# Patient Record
Sex: MALE | Race: WHITE | URBAN - METROPOLITAN AREA
[De-identification: names, ages, dates, MRNs, and addresses within clinical notes are randomized per-mention and may not be internally consistent; named-entity substitution may affect disease eponyms.]

---
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
## 2021-01-06 ENCOUNTER — OFFICE VISIT (OUTPATIENT)
Dept: PRIMARY CARE CLINIC | Age: 21
End: 2021-01-06
Payer: MEDICARE

## 2021-01-06 VITALS
DIASTOLIC BLOOD PRESSURE: 82 MMHG | HEIGHT: 74 IN | TEMPERATURE: 99.1 F | WEIGHT: 238.5 LBS | BODY MASS INDEX: 30.61 KG/M2 | HEART RATE: 74 BPM | RESPIRATION RATE: 20 BRPM | SYSTOLIC BLOOD PRESSURE: 132 MMHG | OXYGEN SATURATION: 98 %

## 2021-01-06 DIAGNOSIS — Z23 NEED FOR VACCINATION: ICD-10-CM

## 2021-01-06 DIAGNOSIS — R63.4 UNINTENDED WEIGHT LOSS: ICD-10-CM

## 2021-01-06 DIAGNOSIS — K52.9 COLITIS: ICD-10-CM

## 2021-01-06 DIAGNOSIS — K58.0 IRRITABLE BOWEL SYNDROME WITH DIARRHEA: Primary | ICD-10-CM

## 2021-01-06 PROCEDURE — 4004F PT TOBACCO SCREEN RCVD TLK: CPT | Performed by: NURSE PRACTITIONER

## 2021-01-06 PROCEDURE — 99214 OFFICE O/P EST MOD 30 MIN: CPT | Performed by: NURSE PRACTITIONER

## 2021-01-06 PROCEDURE — 90471 IMMUNIZATION ADMIN: CPT | Performed by: NURSE PRACTITIONER

## 2021-01-06 PROCEDURE — G8427 DOCREV CUR MEDS BY ELIG CLIN: HCPCS | Performed by: NURSE PRACTITIONER

## 2021-01-06 PROCEDURE — G8417 CALC BMI ABV UP PARAM F/U: HCPCS | Performed by: NURSE PRACTITIONER

## 2021-01-06 PROCEDURE — 90688 IIV4 VACCINE SPLT 0.5 ML IM: CPT | Performed by: NURSE PRACTITIONER

## 2021-01-06 PROCEDURE — G8482 FLU IMMUNIZE ORDER/ADMIN: HCPCS | Performed by: NURSE PRACTITIONER

## 2021-01-06 RX ORDER — ONDANSETRON 4 MG/1
4 TABLET, ORALLY DISINTEGRATING ORAL EVERY 8 HOURS PRN
COMMUNITY
Start: 2020-09-15 | End: 2021-01-06

## 2021-01-06 RX ORDER — DICYCLOMINE HCL 20 MG
20 TABLET ORAL 3 TIMES DAILY PRN
Qty: 90 TABLET | Refills: 0 | Status: SHIPPED | OUTPATIENT
Start: 2021-01-06 | End: 2021-03-09 | Stop reason: SDUPTHER

## 2021-01-06 ASSESSMENT — ENCOUNTER SYMPTOMS
SHORTNESS OF BREATH: 0
HEMATOCHEZIA: 0
VOMITING: 0
COUGH: 0
BELCHING: 0
DIARRHEA: 1
WHEEZING: 0
BLOATING: 1
CONSTIPATION: 0
NAUSEA: 1
SORE THROAT: 0
RHINORRHEA: 0
FLATUS: 0
ABDOMINAL PAIN: 1

## 2021-01-06 ASSESSMENT — PATIENT HEALTH QUESTIONNAIRE - PHQ9
SUM OF ALL RESPONSES TO PHQ9 QUESTIONS 1 & 2: 2
SUM OF ALL RESPONSES TO PHQ QUESTIONS 1-9: 2
1. LITTLE INTEREST OR PLEASURE IN DOING THINGS: 1

## 2021-01-06 ASSESSMENT — CROHNS DISEASE ACTIVITY INDEX (CDAI): CDAI SCORE: 0

## 2021-01-06 NOTE — PROGRESS NOTES
Name: Sami Rojas  : 2000         Chief Complaint:     Chief Complaint   Patient presents with    Weight Loss     X 2 months, loose stools, nausea, poor appetite       History of Present Illness:      Sami Rojas is a 24 y.o.  male who presents with Weight Loss (X 2 months, loose stools, nausea, poor appetite)      Marilou Pride is here today for a routine office visit. Diarrhea   This is a recurrent problem. The current episode started more than 1 month ago. The problem occurs less than 2 times per day. The problem has been waxing and waning. The patient states that diarrhea does not awaken him from sleep. Associated symptoms include abdominal pain, bloating and weight loss. Pertinent negatives include no arthralgias, chills, coughing, fever, headaches, increased  flatus, myalgias, sweats, URI or vomiting. The symptoms are aggravated by stress. There are no known risk factors. He has tried nothing for the symptoms. The treatment provided no relief. His past medical history is significant for inflammatory bowel disease (Suspected) and irritable bowel syndrome. There is no history of bowel resection, malabsorption, a recent abdominal surgery or short gut syndrome.    Abdominal Pain This is a recurrent problem. The current episode started more than 1 month ago. The onset quality is undetermined. The problem occurs intermittently. The problem has been unchanged. The pain is located in the generalized abdominal region. The pain is at a severity of 4/10. The pain is mild. The quality of the pain is cramping and dull. The abdominal pain does not radiate. Associated symptoms include anorexia, diarrhea, nausea and weight loss. Pertinent negatives include no arthralgias, belching, constipation, dysuria, fever, flatus, frequency, headaches, hematochezia, hematuria, melena, myalgias or vomiting. The pain is aggravated by eating. The pain is relieved by nothing. He has tried nothing for the symptoms. The treatment provided no relief. Prior diagnostic workup includes CT scan. His past medical history is significant for irritable bowel syndrome. There is no history of abdominal surgery, colon cancer, Crohn's disease, gallstones, GERD, pancreatitis, PUD or ulcerative colitis. Past Medical History:     Past Medical History:   Diagnosis Date    Anxiety     Asthma     Depression       Reviewed all health maintenance requirements and ordered appropriate tests  There are no preventive care reminders to display for this patient. Past Surgical History:     History reviewed. No pertinent surgical history. Medications:       Prior to Admission medications    Medication Sig Start Date End Date Taking? Authorizing Provider   dicyclomine (BENTYL) 20 MG tablet Take 1 tablet by mouth 3 times daily as needed (cramping) 1/6/21  Yes BLUE Couch CNP   Melatonin 5 MG CAPS Take 1 tablet by mouth nightly as needed (insomnia) 10/1/19  Yes [de-identified] M BLUE Powell CNP        Allergies:       Patient has no known allergies. Social History:     Tobacco:    reports that he has been smoking cigarettes. He has been smoking about 0.50 packs per day.  He has never used smokeless tobacco. Alcohol:      reports no history of alcohol use. Drug Use:  reports no history of drug use. Family History:     Family History   Problem Relation Age of Onset    Depression Mother     Anxiety Disorder Mother     Heart Attack Father     COPD Father     Hypertension Father     Anxiety Disorder Father     High Cholesterol Father     Depression Maternal Grandmother     Anxiety Disorder Maternal Grandmother     COPD Maternal Grandmother     Cancer Maternal Grandmother     Hypertension Maternal Grandmother     Depression Maternal Grandfather     COPD Maternal Grandfather     Hypertension Maternal Grandfather     Cancer Maternal Grandfather        Review of Systems:     Positive and Negative as described in HPI    Review of Systems   Constitutional: Positive for weight loss. Negative for chills, fatigue and fever. HENT: Negative for congestion, rhinorrhea and sore throat. Eyes: Negative for visual disturbance. Respiratory: Negative for cough, shortness of breath and wheezing. Cardiovascular: Negative for chest pain and palpitations. Gastrointestinal: Positive for abdominal pain, anorexia, bloating, diarrhea and nausea. Negative for constipation, flatus, hematochezia, melena and vomiting. Genitourinary: Negative for difficulty urinating, dysuria, frequency and hematuria. Musculoskeletal: Negative for arthralgias, gait problem, myalgias, neck pain and neck stiffness. Skin: Negative for rash. Neurological: Negative for dizziness, syncope, light-headedness and headaches. Physical Exam:   Vitals:  /82 (Site: Left Upper Arm)   Pulse 74   Temp 99.1 °F (37.3 °C) (Temporal)   Resp 20   Ht 6' 2.4\" (1.89 m)   Wt 238 lb 8 oz (108.2 kg)   SpO2 98%   BMI 30.29 kg/m²     Physical Exam  Vitals signs and nursing note reviewed. Constitutional:       General: He is not in acute distress. Appearance: Normal appearance. He is not ill-appearing.    HENT:      Mouth/Throat: Mouth: Mucous membranes are moist.   Eyes:      General: No scleral icterus. Conjunctiva/sclera: Conjunctivae normal.   Neck:      Musculoskeletal: Normal range of motion and neck supple. Cardiovascular:      Rate and Rhythm: Normal rate and regular rhythm. Heart sounds: No murmur. Pulmonary:      Effort: Pulmonary effort is normal.      Breath sounds: Normal breath sounds. No wheezing. Abdominal:      General: Bowel sounds are normal. There is no distension. Palpations: Abdomen is soft. Tenderness: There is abdominal tenderness (mild general). Musculoskeletal:      Right lower leg: No edema. Left lower leg: No edema. Skin:     General: Skin is warm and dry. Findings: No rash. Neurological:      Mental Status: He is alert and oriented to person, place, and time. Psychiatric:         Mood and Affect: Mood normal.         Behavior: Behavior normal.         Data:     Lab Results   Component Value Date     09/30/2019    K 4.8 09/30/2019     09/30/2019    CO2 25 09/30/2019    BUN 10 09/30/2019    CREATININE 0.68 09/30/2019    GLUCOSE 92 09/30/2019    AST 18 09/30/2019    ALT 26 09/30/2019     Lab Results   Component Value Date    WBC 8.1 09/30/2019    RBC 5.66 09/30/2019    HGB 16.2 09/30/2019    HCT 51.0 09/30/2019    MCV 90.1 09/30/2019    MCH 28.6 09/30/2019    MCHC 31.8 09/30/2019    RDW 12.8 09/30/2019     09/30/2019    MPV 9.1 09/30/2019     Lab Results   Component Value Date    TSH 1.88 09/30/2019     Lab Results   Component Value Date    CHOL 207 09/30/2019    HDL 42 09/30/2019       Assessment/Plan:      Diagnosis Orders   1. Irritable bowel syndrome with diarrhea  dicyclomine (BENTYL) 20 MG tablet    CT ABDOMEN PELVIS W IV CONTRAST Additional Contrast? Radiologist Recommendation   2. Colitis  CT ABDOMEN PELVIS W IV CONTRAST Additional Contrast? Radiologist Recommendation   3.  Unintended weight loss  CBC Auto Differential Comprehensive Metabolic Panel    Amylase    Lipase    XR CHEST STANDARD (2 VW)    CT ABDOMEN PELVIS W IV CONTRAST Additional Contrast? Radiologist Recommendation   4. Need for vaccination  INFLUENZA, QUADV, 3 YRS AND OLDER, IM, MDV, 0.5ML (Abigail Singh)     We will proceed with labs, chest x-ray and CT of the abdomen and pelvis. Start dicyclomine 20 mg 3 times daily as needed for diarrhea or cramping. We will follow-up in 2 weeks or sooner with results or if any worsening. 1.  Ariadna Read received counseling on the following healthy behaviors: nutrition, exercise and medication adherence  2. Patient given educational materials - see patient instructions  3. Was a self-tracking handout given in paper form or via [employer]? No  If yes, see orders or list here. 4.  Discussed use, benefit, and side effects of prescribed medications. Barriers to medication compliance addressed. All patient questions answered. Pt voiced understanding. 5.  Reviewed prior labs and health maintenance  6. Continue current medications, diet and exercise. Completed Refills   Requested Prescriptions     Signed Prescriptions Disp Refills    dicyclomine (BENTYL) 20 MG tablet 90 tablet 0     Sig: Take 1 tablet by mouth 3 times daily as needed (cramping)         Return in about 2 weeks (around 1/20/2021) for Recheck.

## 2021-01-06 NOTE — PATIENT INSTRUCTIONS
SURVEY:    You may be receiving a survey from [employer] regarding your visit today. Please complete the survey to enable us to provide the highest quality of care to you and your family. If you cannot score us a very good on any question, please call the office to discuss how we could have made your experience a very good one. Thank you.

## 2021-01-06 NOTE — PROGRESS NOTES
After obtaining consent, and per orders of BLUE Brooke, injection of influenza given in Left deltoid by Leta Ross. Patient instructed to remain in clinic for 20 minutes afterwards, and to report any adverse reaction to me immediately.

## 2021-01-13 ENCOUNTER — TELEPHONE (OUTPATIENT)
Dept: PRIMARY CARE CLINIC | Age: 21
End: 2021-01-13

## 2021-01-13 DIAGNOSIS — R63.4 UNINTENDED WEIGHT LOSS: ICD-10-CM

## 2021-01-13 LAB
ALBUMIN SERPL-MCNC: NORMAL G/DL
ALP BLD-CCNC: 57 U/L
ALT SERPL-CCNC: 25 U/L
AMYLASE: 47 UNITS/L
ANION GAP SERPL CALCULATED.3IONS-SCNC: NORMAL MMOL/L
AST SERPL-CCNC: 21 U/L
BASOPHILS ABSOLUTE: 0.1 /ΜL
BASOPHILS RELATIVE PERCENT: 0.6 %
BILIRUB SERPL-MCNC: 0.4 MG/DL (ref 0.1–1.4)
BUN BLDV-MCNC: 19 MG/DL
CALCIUM SERPL-MCNC: 9.7 MG/DL
CHLORIDE BLD-SCNC: 106 MMOL/L
CO2: NORMAL
CREAT SERPL-MCNC: 0.99 MG/DL
EOSINOPHILS ABSOLUTE: NORMAL
EOSINOPHILS RELATIVE PERCENT: 3.6 %
GFR CALCULATED: >60
GLUCOSE BLD-MCNC: 94 MG/DL
HCT VFR BLD CALC: 45.5 % (ref 41–53)
HEMOGLOBIN: 15.5 G/DL (ref 13.5–17.5)
LIPASE: 42 UNITS/L
LYMPHOCYTES ABSOLUTE: 3.5 /ΜL
LYMPHOCYTES RELATIVE PERCENT: 36.9 %
MCH RBC QN AUTO: 30 PG
MCHC RBC AUTO-ENTMCNC: 34 G/DL
MCV RBC AUTO: 88 FL
MONOCYTES ABSOLUTE: 0.6 /ΜL
MONOCYTES RELATIVE PERCENT: 6.8 %
NEUTROPHILS ABSOLUTE: 4.9 /ΜL
NEUTROPHILS RELATIVE PERCENT: 52.1 %
PDW BLD-RTO: NORMAL %
PLATELET # BLD: 262 K/ΜL
PMV BLD AUTO: 7.8 FL
POTASSIUM SERPL-SCNC: 4.4 MMOL/L
RBC # BLD: 5.16 10^6/ΜL
SODIUM BLD-SCNC: 144 MMOL/L
TOTAL PROTEIN: 7.4
WBC # BLD: 9.4 10^3/ML

## 2021-01-13 NOTE — TELEPHONE ENCOUNTER
----- Message from [address] [place] [address], BLUE - CNP sent at 1/13/2021  2:48 PM EST -----  Results are normal, please call patient and make them aware.

## 2021-01-14 ENCOUNTER — HOSPITAL ENCOUNTER (OUTPATIENT)
Dept: CT IMAGING | Age: 21
Discharge: HOME | End: 2021-01-16
Payer: MEDICARE

## 2021-01-14 ENCOUNTER — TELEPHONE (OUTPATIENT)
Dept: PRIMARY CARE CLINIC | Age: 21
End: 2021-01-14

## 2021-01-14 DIAGNOSIS — R63.4 UNINTENDED WEIGHT LOSS: ICD-10-CM

## 2021-01-14 DIAGNOSIS — K52.9 COLITIS: ICD-10-CM

## 2021-01-14 DIAGNOSIS — K58.0 IRRITABLE BOWEL SYNDROME WITH DIARRHEA: ICD-10-CM

## 2021-01-14 PROCEDURE — 6360000004 HC RX CONTRAST MEDICATION: Performed by: NURSE PRACTITIONER

## 2021-01-14 PROCEDURE — 74177 CT ABD & PELVIS W/CONTRAST: CPT

## 2021-01-14 RX ADMIN — IOPAMIDOL 18 ML: 755 INJECTION, SOLUTION INTRAVENOUS at 13:58

## 2021-01-14 RX ADMIN — IOPAMIDOL 75 ML: 755 INJECTION, SOLUTION INTRAVENOUS at 13:58

## 2021-01-14 NOTE — TELEPHONE ENCOUNTER
----- Message from [address] [place] [address], BLUE - CNP sent at 1/14/2021  2:24 PM EST -----  Results are normal, please call patient and make them aware.

## 2021-01-20 ENCOUNTER — OFFICE VISIT (OUTPATIENT)
Dept: PRIMARY CARE CLINIC | Age: 21
End: 2021-01-20
Payer: MEDICARE

## 2021-01-20 VITALS
SYSTOLIC BLOOD PRESSURE: 132 MMHG | OXYGEN SATURATION: 99 % | HEART RATE: 88 BPM | BODY MASS INDEX: 29.32 KG/M2 | TEMPERATURE: 98.8 F | DIASTOLIC BLOOD PRESSURE: 82 MMHG | RESPIRATION RATE: 20 BRPM | WEIGHT: 230.8 LBS

## 2021-01-20 DIAGNOSIS — G47.00 INSOMNIA, UNSPECIFIED TYPE: ICD-10-CM

## 2021-01-20 DIAGNOSIS — K58.0 IRRITABLE BOWEL SYNDROME WITH DIARRHEA: Primary | ICD-10-CM

## 2021-01-20 PROCEDURE — G8427 DOCREV CUR MEDS BY ELIG CLIN: HCPCS | Performed by: NURSE PRACTITIONER

## 2021-01-20 PROCEDURE — 99214 OFFICE O/P EST MOD 30 MIN: CPT | Performed by: NURSE PRACTITIONER

## 2021-01-20 PROCEDURE — G8417 CALC BMI ABV UP PARAM F/U: HCPCS | Performed by: NURSE PRACTITIONER

## 2021-01-20 PROCEDURE — G8482 FLU IMMUNIZE ORDER/ADMIN: HCPCS | Performed by: NURSE PRACTITIONER

## 2021-01-20 PROCEDURE — 4004F PT TOBACCO SCREEN RCVD TLK: CPT | Performed by: NURSE PRACTITIONER

## 2021-01-20 RX ORDER — TRAZODONE HYDROCHLORIDE 50 MG/1
50 TABLET ORAL NIGHTLY
Qty: 90 TABLET | Refills: 1 | Status: SHIPPED | OUTPATIENT
Start: 2021-01-20

## 2021-01-20 ASSESSMENT — ENCOUNTER SYMPTOMS
DIARRHEA: 1
COUGH: 0
WHEEZING: 0
NAUSEA: 0
BELCHING: 0
ABDOMINAL PAIN: 0
CONSTIPATION: 0
RHINORRHEA: 0
FLATUS: 0
SHORTNESS OF BREATH: 0
SORE THROAT: 0
BLOATING: 0
VOMITING: 0
HEMATOCHEZIA: 0

## 2021-01-20 ASSESSMENT — CROHNS DISEASE ACTIVITY INDEX (CDAI): CDAI SCORE: 0

## 2021-01-20 NOTE — PATIENT INSTRUCTIONS
SURVEY:    You may be receiving a survey from [employer] regarding your visit today. Please complete the survey to enable us to provide the highest quality of care to you and your family. If you cannot score us a very good on any question, please call the office to discuss how we could have made your experience a very good one. Thank you. Patient Education        Diet for Irritable Bowel Syndrome: Care Instructions  Your Care Instructions     Irritable bowel syndrome, or IBS, is a problem with the intestines. IBS can cause belly pain, bloating, gas, constipation, and diarrhea. Most people can control their symptoms by changing their diet and easing stress. No specific foods cause everyone with IBS to have symptoms. Many people find that they feel better by limiting or eliminating foods that may bring on symptoms. Make sure you don't stop eating all foods from any one food group without talking with a dietitian. You need to make sure you are still getting all the nutrients you need. Follow-up care is a key part of your treatment and safety. Be sure to make and go to all appointments, and call your doctor if you are having problems. It's also a good idea to know your test results and keep a list of the medicines you take. How can you care for yourself at home? To reduce constipation  · Include fruits, vegetables, beans, and whole grains in your diet each day. These foods are high in fiber. Slowly increase the amount of fiber you eat. This helps you avoid a lot of gas. · Drink plenty of fluids. If you have kidney, heart, or liver disease and have to limit fluids, talk with your doctor before you increase the amount of fluids you drink. · Get some exercise every day. Build up slowly to 30 to 60 minutes a day on 5 or more days of the week. · Take a fiber supplement, such as Citrucel or Metamucil, every day if needed. Read and follow all instructions on the label. · Schedule time each day for a bowel movement. Having a daily routine may help. Take your time and do not strain when having a bowel movement. · Check with your doctor before you increase the amount of fiber in your diet. For some people who have IBS, eating more fiber may make some symptoms worse. This includes bloating. To reduce diarrhea  You may try giving up foods or drinks one at a time to see whether symptoms improve. Limit or avoid the following:  · Alcohol  · Caffeine, which is found in coffee, tea, cola drinks, and chocolate  · Nicotine, from smoking or chewing tobacco  · Gas-producing foods, such as beans, broccoli, cabbage, and apples  · Dairy products that contain lactose (milk sugar), such as ice cream and milk. · Foods and drinks high in sugar, especially fruit juice, soda, candy, and other packaged sweets (such as cookies)  · Foods high in fat, including bundy, sausage, butter, oils, and anything deep-fried  · Sorbitol and xylitol, artificial sweeteners found in some sugarless candies and chewing gum  Keep track of foods  · Some people with IBS use a daily food diary to keep track of what they eat and whether they have any symptoms after eating certain foods. The diary also can be a good way to record what is going on in your life. · Stress plays a role in IBS. So if you are aware that certain stresses bring on symptoms, you can try to reduce those stresses. Keep mealtimes pleasant  · Try to maintain a pleasant environment when you eat. This may reduce stress that can make symptoms likely to occur. · Give yourself plenty of time to eat, rather than eating on the go. Chew your food slowly. Try not to swallow air, which can cause bloating. Where can you learn more? Go to [email or web address]. org and sign in to your [employer] account. Enter K009 in the Tap[employer]tv box to learn more about \"Diet for Irritable Bowel Syndrome: Care Instructions. \" If you do not have an account, please click on the \"Sign Up Now\" link. Current as of: August 22, 2019               Content Version: 12.6  © 6417-3411 [employer]. Care instructions adapted under license by [facility] ([facility]). If you have questions about a medical condition or this instruction, always ask your healthcare professional. [address] any warranty or liability for your use of this information.

## 2021-01-20 NOTE — PROGRESS NOTES
Name: Mireya Montero  : 2000         Chief Complaint:     Chief Complaint   Patient presents with    Irritable Bowel Syndrome     doing better       History of Present Illness:      Mireya Montero is a 24 y.o.  male who presents with Irritable Bowel Syndrome (doing better)      Frenando Sims is here today for a routine office visit. IBS with diarrheagreatly improved since starting dicyclomine. Had negative CT and lab work. States using the medication as needed with great results. Primary insomniaworsening, patient using melatonin with little to no relief. Patient states he gets a few hours of sleep and then wakes up. Patient previously taking sertraline with little benefit. Is requesting to try different medication. Diarrhea   This is a chronic problem. The current episode started more than 1 year ago. The problem occurs less than 2 times per day. The problem has been gradually improving. The stool consistency is described as watery. The patient states that diarrhea does not awaken him from sleep. Pertinent negatives include no abdominal pain, arthralgias, bloating, chills, coughing, fever, headaches, increased  flatus, myalgias, sweats, URI, vomiting or weight loss. The symptoms are aggravated by stress. There are no known risk factors. Treatments tried: DICYCLOMINE. The treatment provided significant relief. His past medical history is significant for irritable bowel syndrome. There is no history of bowel resection, inflammatory bowel disease, malabsorption, a recent abdominal surgery or short gut syndrome.    Abdominal Pain This is a recurrent problem. The current episode started more than 1 month ago. The onset quality is undetermined. The problem occurs rarely. The problem has been resolved. The pain is located in the generalized abdominal region. The pain is at a severity of 0/10. The patient is experiencing no pain. The quality of the pain is cramping and dull. The abdominal pain does not radiate. Associated symptoms include diarrhea (IMPROVED). Pertinent negatives include no anorexia, arthralgias, belching, constipation, dysuria, fever, flatus, frequency, headaches, hematochezia, hematuria, melena, myalgias, nausea, vomiting or weight loss. The pain is aggravated by eating. The pain is relieved by nothing. Treatments tried: DICYCLOMINE. The treatment provided significant relief. Prior diagnostic workup includes CT scan. His past medical history is significant for irritable bowel syndrome. There is no history of abdominal surgery, colon cancer, Crohn's disease, gallstones, GERD, pancreatitis, PUD or ulcerative colitis. Past Medical History:     Past Medical History:   Diagnosis Date    Anxiety     Asthma     Depression       Reviewed all health maintenance requirements and ordered appropriate tests  There are no preventive care reminders to display for this patient. Past Surgical History:     History reviewed. No pertinent surgical history. Medications:       Prior to Admission medications    Medication Sig Start Date End Date Taking? Authorizing Provider   traZODone (DESYREL) 50 MG tablet Take 1 tablet by mouth nightly 1/20/21  Yes BLUE Ryan CNP   dicyclomine (BENTYL) 20 MG tablet Take 1 tablet by mouth 3 times daily as needed (cramping) 1/6/21  Yes BLUE Ryan CNP        Allergies:       Patient has no known allergies. Social History:     Tobacco:    reports that he has been smoking cigarettes. He has been smoking about 0.50 packs per day.  He has never used smokeless tobacco. Alcohol:      reports no history of alcohol use. Drug Use:  reports no history of drug use. Family History:     Family History   Problem Relation Age of Onset    Depression Mother     Anxiety Disorder Mother     Heart Attack Father     COPD Father     Hypertension Father     Anxiety Disorder Father     High Cholesterol Father     Depression Maternal Grandmother     Anxiety Disorder Maternal Grandmother     COPD Maternal Grandmother     Cancer Maternal Grandmother     Hypertension Maternal Grandmother     Depression Maternal Grandfather     COPD Maternal Grandfather     Hypertension Maternal Grandfather     Cancer Maternal Grandfather        Review of Systems:     Positive and Negative as described in HPI    Review of Systems   Constitutional: Negative for chills, fatigue, fever and weight loss. HENT: Negative for congestion, rhinorrhea and sore throat. Eyes: Negative for visual disturbance. Respiratory: Negative for cough, shortness of breath and wheezing. Cardiovascular: Negative for chest pain and palpitations. Gastrointestinal: Positive for diarrhea (IMPROVED). Negative for abdominal pain, anorexia, bloating, constipation, flatus, hematochezia, melena, nausea and vomiting. Genitourinary: Negative for difficulty urinating, dysuria, frequency and hematuria. Musculoskeletal: Negative for arthralgias, gait problem, myalgias, neck pain and neck stiffness. Skin: Negative for rash. Neurological: Negative for dizziness, syncope, light-headedness and headaches. Psychiatric/Behavioral: Positive for sleep disturbance. Physical Exam:   Vitals:  /82 (Site: Left Upper Arm)   Pulse 88   Temp 98.8 °F (37.1 °C) (Temporal)   Resp 20   Wt 230 lb 12.8 oz (104.7 kg)   SpO2 99%   BMI 29.32 kg/m²     Physical Exam  Vitals signs and nursing note reviewed. Constitutional:       General: He is not in acute distress. Appearance: Normal appearance. He is not ill-appearing. HENT:      Mouth/Throat:      Mouth: Mucous membranes are moist.   Eyes:      General: No scleral icterus. Conjunctiva/sclera: Conjunctivae normal.   Neck:      Musculoskeletal: Normal range of motion and neck supple. Cardiovascular:      Rate and Rhythm: Normal rate and regular rhythm. Heart sounds: No murmur. Pulmonary:      Effort: Pulmonary effort is normal.      Breath sounds: Normal breath sounds. No wheezing. Abdominal:      General: Bowel sounds are normal. There is no distension. Palpations: Abdomen is soft. Tenderness: There is no abdominal tenderness. Musculoskeletal:      Right lower leg: No edema. Left lower leg: No edema. Skin:     General: Skin is warm and dry. Findings: No rash. Neurological:      Mental Status: He is alert and oriented to person, place, and time. Psychiatric:         Mood and Affect: Mood normal.         Behavior: Behavior normal.         Data:     Lab Results   Component Value Date     01/08/2021    K 4.4 01/08/2021     01/08/2021    CO2 25 09/30/2019    BUN 19 01/08/2021    CREATININE 0.99 01/08/2021    GLUCOSE 94 01/08/2021    BILITOT 0.4 01/08/2021    ALKPHOS 57 01/08/2021    AST 21 01/08/2021    ALT 25 01/08/2021     Lab Results   Component Value Date    WBC 9.4 01/08/2021    RBC 5.16 01/08/2021    HGB 15.5 01/08/2021    HCT 45.5 01/08/2021    MCV 88 01/08/2021    MCH 30.0 01/08/2021    MCHC 34.0 01/08/2021    RDW 12.8 09/30/2019     01/08/2021    MPV 7.8 01/08/2021     Lab Results   Component Value Date    TSH 1.88 09/30/2019     Lab Results   Component Value Date    CHOL 207 09/30/2019    HDL 42 09/30/2019       Assessment/Plan:      Diagnosis Orders   1. Insomnia, unspecified type  traZODone (DESYREL) 50 MG tablet   2. Irritable bowel syndrome with diarrhea       We will continue dicyclomine as needed for IBS with diarrhea. He will call if any changes.

## 2021-01-22 ENCOUNTER — TELEPHONE (OUTPATIENT)
Dept: PRIMARY CARE CLINIC | Age: 21
End: 2021-01-22

## 2021-03-03 ENCOUNTER — TELEPHONE (OUTPATIENT)
Dept: PRIMARY CARE CLINIC | Age: 21
End: 2021-03-03

## 2021-03-09 DIAGNOSIS — G47.00 INSOMNIA, UNSPECIFIED TYPE: ICD-10-CM

## 2021-03-09 DIAGNOSIS — K58.0 IRRITABLE BOWEL SYNDROME WITH DIARRHEA: ICD-10-CM

## 2021-03-09 RX ORDER — TRAZODONE HYDROCHLORIDE 50 MG/1
50 TABLET ORAL NIGHTLY
Qty: 90 TABLET | Refills: 1 | Status: CANCELLED | OUTPATIENT
Start: 2021-03-09

## 2021-03-09 RX ORDER — DICYCLOMINE HCL 20 MG
20 TABLET ORAL 3 TIMES DAILY PRN
Qty: 90 TABLET | Refills: 0 | Status: SHIPPED | OUTPATIENT
Start: 2021-03-09

## 2021-03-09 NOTE — TELEPHONE ENCOUNTER
Health Maintenance   Topic Date Due    Varicella vaccine (2 of 2 - 2-dose childhood series) 04/11/2005    HPV vaccine (1 - Male 2-dose series) Never done    Hepatitis A vaccine (2 of 2 - 2-dose series) 01/06/2022 (Originally 4/10/2014)    Pneumococcal 0-64 years Vaccine (1 of 1 - PPSV23) 01/06/2022 (Originally 4/11/2007)    Hepatitis C screen  01/06/2022 (Originally 2000)    HIV screen  01/06/2022 (Originally 4/11/2016)    DTaP/Tdap/Td vaccine (6 - Td) 10/10/2023    Hepatitis B vaccine  Completed    Hib vaccine  Completed    Flu vaccine  Completed    Meningococcal (ACWY) vaccine  Aged Out             (applicable per patient's age: Cancer Screenings, Depression Screening, Fall Risk Screening, Immunizations)    LDL Cholesterol (mg/dL)   Date Value   09/30/2019 142 (H)     AST (U/L)   Date Value   01/08/2021 21     ALT (U/L)   Date Value   01/08/2021 25     BUN (mg/dL)   Date Value   01/08/2021 19      (goal A1C is < 7)   (goal LDL is <100) need 30-50% reduction from baseline     BP Readings from Last 3 Encounters:   01/20/21 132/82   01/06/21 132/82   02/19/20 133/84    (goal /80)      All Future Testing planned in [facility]:  Lab Frequency Next Occurrence   XR CHEST STANDARD (2 VW) Once 03/24/2021       Next Visit Date:  Future Appointments   Date Time Provider Dequan Newsome   7/20/2021 11:00 AM BLUE Mcadams - CNP [facility]            Patient Active Problem List:     Anxiety and depression     Insomnia

## 2021-04-08 ENCOUNTER — TELEPHONE (OUTPATIENT)
Dept: PRIMARY CARE CLINIC | Age: 21
End: 2021-04-08

## 2021-04-08 NOTE — TELEPHONE ENCOUNTER
Attempted to call patient for the third time to remind of having chest xray done that Luba had ordered. No answer and unable to leave message, mailbox is full.

## 2021-04-30 ENCOUNTER — TELEPHONE (OUTPATIENT)
Dept: PRIMARY CARE CLINIC | Age: 21
End: 2021-04-30

## 2021-04-30 NOTE — TELEPHONE ENCOUNTER
Patient has had 3 attempts to remind to have chest xray done, xray extended until next appt 7/20/21.

## 2023-11-02 ENCOUNTER — OFFICE VISIT (OUTPATIENT)
Dept: PRIMARY CARE CLINIC | Age: 23
End: 2023-11-02
Payer: MEDICAID

## 2023-11-02 VITALS
BODY MASS INDEX: 30.16 KG/M2 | TEMPERATURE: 98.5 F | WEIGHT: 235 LBS | DIASTOLIC BLOOD PRESSURE: 84 MMHG | SYSTOLIC BLOOD PRESSURE: 128 MMHG | OXYGEN SATURATION: 99 % | RESPIRATION RATE: 16 BRPM | HEART RATE: 93 BPM | HEIGHT: 74 IN

## 2023-11-02 DIAGNOSIS — Z23 NEED FOR INFLUENZA VACCINATION: ICD-10-CM

## 2023-11-02 DIAGNOSIS — K58.2 IRRITABLE BOWEL SYNDROME WITH BOTH CONSTIPATION AND DIARRHEA: Primary | ICD-10-CM

## 2023-11-02 PROCEDURE — 90471 IMMUNIZATION ADMIN: CPT | Performed by: NURSE PRACTITIONER

## 2023-11-02 PROCEDURE — 99214 OFFICE O/P EST MOD 30 MIN: CPT | Performed by: NURSE PRACTITIONER

## 2023-11-02 PROCEDURE — 90674 CCIIV4 VAC NO PRSV 0.5 ML IM: CPT | Performed by: NURSE PRACTITIONER

## 2023-11-02 RX ORDER — DICYCLOMINE HCL 20 MG
20 TABLET ORAL 3 TIMES DAILY PRN
Qty: 90 TABLET | Refills: 0 | Status: SHIPPED | OUTPATIENT
Start: 2023-11-02

## 2023-11-02 SDOH — ECONOMIC STABILITY: FOOD INSECURITY: WITHIN THE PAST 12 MONTHS, YOU WORRIED THAT YOUR FOOD WOULD RUN OUT BEFORE YOU GOT MONEY TO BUY MORE.: NEVER TRUE

## 2023-11-02 SDOH — ECONOMIC STABILITY: INCOME INSECURITY: HOW HARD IS IT FOR YOU TO PAY FOR THE VERY BASICS LIKE FOOD, HOUSING, MEDICAL CARE, AND HEATING?: NOT HARD AT ALL

## 2023-11-02 SDOH — ECONOMIC STABILITY: HOUSING INSECURITY
IN THE LAST 12 MONTHS, WAS THERE A TIME WHEN YOU DID NOT HAVE A STEADY PLACE TO SLEEP OR SLEPT IN A SHELTER (INCLUDING NOW)?: NO

## 2023-11-02 SDOH — ECONOMIC STABILITY: FOOD INSECURITY: WITHIN THE PAST 12 MONTHS, THE FOOD YOU BOUGHT JUST DIDN'T LAST AND YOU DIDN'T HAVE MONEY TO GET MORE.: NEVER TRUE

## 2023-11-02 ASSESSMENT — PATIENT HEALTH QUESTIONNAIRE - PHQ9
8. MOVING OR SPEAKING SO SLOWLY THAT OTHER PEOPLE COULD HAVE NOTICED. OR THE OPPOSITE, BEING SO FIGETY OR RESTLESS THAT YOU HAVE BEEN MOVING AROUND A LOT MORE THAN USUAL: 0
SUM OF ALL RESPONSES TO PHQ QUESTIONS 1-9: 0
4. FEELING TIRED OR HAVING LITTLE ENERGY: 0
5. POOR APPETITE OR OVEREATING: 0
1. LITTLE INTEREST OR PLEASURE IN DOING THINGS: 0
SUM OF ALL RESPONSES TO PHQ QUESTIONS 1-9: 0
7. TROUBLE CONCENTRATING ON THINGS, SUCH AS READING THE NEWSPAPER OR WATCHING TELEVISION: 0
SUM OF ALL RESPONSES TO PHQ9 QUESTIONS 1 & 2: 0
SUM OF ALL RESPONSES TO PHQ QUESTIONS 1-9: 0
6. FEELING BAD ABOUT YOURSELF - OR THAT YOU ARE A FAILURE OR HAVE LET YOURSELF OR YOUR FAMILY DOWN: 0
10. IF YOU CHECKED OFF ANY PROBLEMS, HOW DIFFICULT HAVE THESE PROBLEMS MADE IT FOR YOU TO DO YOUR WORK, TAKE CARE OF THINGS AT HOME, OR GET ALONG WITH OTHER PEOPLE: 0
2. FEELING DOWN, DEPRESSED OR HOPELESS: 0
3. TROUBLE FALLING OR STAYING ASLEEP: 0
SUM OF ALL RESPONSES TO PHQ QUESTIONS 1-9: 0
9. THOUGHTS THAT YOU WOULD BE BETTER OFF DEAD, OR OF HURTING YOURSELF: 0

## 2023-11-02 ASSESSMENT — COLUMBIA-SUICIDE SEVERITY RATING SCALE - C-SSRS
5. HAVE YOU STARTED TO WORK OUT OR WORKED OUT THE DETAILS OF HOW TO KILL YOURSELF? DO YOU INTEND TO CARRY OUT THIS PLAN?: NO
3. HAVE YOU BEEN THINKING ABOUT HOW YOU MIGHT KILL YOURSELF?: NO
7. DID THIS OCCUR IN THE LAST THREE MONTHS: NO
4. HAVE YOU HAD THESE THOUGHTS AND HAD SOME INTENTION OF ACTING ON THEM?: NO

## 2023-11-02 NOTE — PROGRESS NOTES
[facility] PHYSICIANS  Benja Harvey, [address] PRIMARY CARE  [address] [facility] [address] [facility]  Dept: [phone]  Dept Fax: [phone]      Name: Ulisses Cee  : 2000         Chief Complaint:     Chief Complaint   Patient presents with    Abdominal Cramping     X 1 week. Patient c/o feeling bloated, crampy, and abdominal pain. Patient does not have a certain time that he feels more symptoms. Abdominal Pain     X 1 week. Bloated     X 1 week. History of Present Illness:      Ulisses Cee is a 26 y.o.  male who presents with Abdominal Cramping (X 1 week. Patient c/o feeling bloated, crampy, and abdominal pain. Patient does not have a certain time that he feels more symptoms. ), Abdominal Pain (X 1 week. ), and Bloated (X 1 week. )      CRUZ Villela is here today for complaints of abdominal cramping and darker colored stools. No diarrhea. He has had some constipation. He has mild bloating. He states his constipation has been anywhere from 1-2 days without a BM. He states that his diet is not the best.  In the last 24 hours he ate a couple burgers and jalepeno cornbread. He did have relief in the past with Bentyl but has been out of it. Past Medical History:     Past Medical History:   Diagnosis Date    Anxiety     Asthma     Depression       Reviewed all health maintenance requirements and ordered appropriate tests  Health Maintenance Due   Topic Date Due    Depression Monitoring  Never done    Flu vaccine (1) 2023       Past Surgical History:     No past surgical history on file. Medications:       Prior to Admission medications    Medication Sig Start Date End Date Taking? Authorizing Provider   dicyclomine (BENTYL) 20 MG tablet Take 1 tablet by mouth 3 times daily as needed (cramping) 23  Yes Romana Gayer, APRN - CNP        Allergies:       Patient has no known allergies.     Social History:     Tobacco:    reports that he has been smoking

## 2023-11-02 NOTE — PROGRESS NOTES
After obtaining consent, and per orders of Alonso RICHARDSON, injection of flu vaccine given in Left deltoid by Bud Flood MA. Patient instructed to remain in clinic for 20 minutes afterwards, and to report any adverse reaction to me immediately. Vaccine Information Sheet, \"Influenza - Inactivated\"  given to Joanna Yarbrough, or parent/legal guardian of  Joanna Yarbrough and verbalized understanding. Patient responses:    Have you ever had a reaction to a flu vaccine? Yes  Are you able to eat eggs without adverse effects? Yes  Do you have any current illness? No  Have you ever had Guillian Carroll Syndrome? No    Flu vaccine given per order. Please see immunization tab.

## 2023-11-05 ASSESSMENT — ENCOUNTER SYMPTOMS
EYES NEGATIVE: 1
ABDOMINAL DISTENTION: 1
ALLERGIC/IMMUNOLOGIC NEGATIVE: 1
CONSTIPATION: 1
ABDOMINAL PAIN: 1
RESPIRATORY NEGATIVE: 1

## 2024-01-03 ENCOUNTER — OFFICE VISIT (OUTPATIENT)
Dept: PRIMARY CARE CLINIC | Age: 24
End: 2024-01-03
Payer: MEDICAID

## 2024-01-03 VITALS
DIASTOLIC BLOOD PRESSURE: 70 MMHG | OXYGEN SATURATION: 98 % | TEMPERATURE: 98.5 F | RESPIRATION RATE: 18 BRPM | BODY MASS INDEX: 29.62 KG/M2 | HEART RATE: 114 BPM | SYSTOLIC BLOOD PRESSURE: 122 MMHG | WEIGHT: 230.7 LBS

## 2024-01-03 DIAGNOSIS — F51.04 PSYCHOPHYSIOLOGICAL INSOMNIA: ICD-10-CM

## 2024-01-03 DIAGNOSIS — K58.2 IRRITABLE BOWEL SYNDROME WITH BOTH CONSTIPATION AND DIARRHEA: Primary | ICD-10-CM

## 2024-01-03 PROCEDURE — 99214 OFFICE O/P EST MOD 30 MIN: CPT | Performed by: NURSE PRACTITIONER

## 2024-01-03 SDOH — ECONOMIC STABILITY: FOOD INSECURITY: WITHIN THE PAST 12 MONTHS, THE FOOD YOU BOUGHT JUST DIDN'T LAST AND YOU DIDN'T HAVE MONEY TO GET MORE.: PATIENT DECLINED

## 2024-01-03 SDOH — ECONOMIC STABILITY: INCOME INSECURITY: HOW HARD IS IT FOR YOU TO PAY FOR THE VERY BASICS LIKE FOOD, HOUSING, MEDICAL CARE, AND HEATING?: PATIENT DECLINED

## 2024-01-03 SDOH — ECONOMIC STABILITY: FOOD INSECURITY: WITHIN THE PAST 12 MONTHS, YOU WORRIED THAT YOUR FOOD WOULD RUN OUT BEFORE YOU GOT MONEY TO BUY MORE.: PATIENT DECLINED

## 2024-01-03 SDOH — ECONOMIC STABILITY: HOUSING INSECURITY
IN THE LAST 12 MONTHS, WAS THERE A TIME WHEN YOU DID NOT HAVE A STEADY PLACE TO SLEEP OR SLEPT IN A SHELTER (INCLUDING NOW)?: PATIENT DECLINED

## 2024-01-03 ASSESSMENT — ENCOUNTER SYMPTOMS
COUGH: 0
RHINORRHEA: 0
NAUSEA: 0
SORE THROAT: 0
CONSTIPATION: 0
WHEEZING: 0
FLATUS: 0
SHORTNESS OF BREATH: 0
ABDOMINAL PAIN: 0
BLOATING: 0
VOMITING: 0
DIARRHEA: 1

## 2024-01-03 ASSESSMENT — PATIENT HEALTH QUESTIONNAIRE - PHQ9
SUM OF ALL RESPONSES TO PHQ QUESTIONS 1-9: 0
SUM OF ALL RESPONSES TO PHQ QUESTIONS 1-9: 0
1. LITTLE INTEREST OR PLEASURE IN DOING THINGS: 0
SUM OF ALL RESPONSES TO PHQ9 QUESTIONS 1 & 2: 0
5. POOR APPETITE OR OVEREATING: 0
4. FEELING TIRED OR HAVING LITTLE ENERGY: 0
3. TROUBLE FALLING OR STAYING ASLEEP: 0
8. MOVING OR SPEAKING SO SLOWLY THAT OTHER PEOPLE COULD HAVE NOTICED. OR THE OPPOSITE, BEING SO FIGETY OR RESTLESS THAT YOU HAVE BEEN MOVING AROUND A LOT MORE THAN USUAL: 0
10. IF YOU CHECKED OFF ANY PROBLEMS, HOW DIFFICULT HAVE THESE PROBLEMS MADE IT FOR YOU TO DO YOUR WORK, TAKE CARE OF THINGS AT HOME, OR GET ALONG WITH OTHER PEOPLE: 0
7. TROUBLE CONCENTRATING ON THINGS, SUCH AS READING THE NEWSPAPER OR WATCHING TELEVISION: 0
SUM OF ALL RESPONSES TO PHQ QUESTIONS 1-9: 0
SUM OF ALL RESPONSES TO PHQ QUESTIONS 1-9: 0
6. FEELING BAD ABOUT YOURSELF - OR THAT YOU ARE A FAILURE OR HAVE LET YOURSELF OR YOUR FAMILY DOWN: 0
2. FEELING DOWN, DEPRESSED OR HOPELESS: 0
9. THOUGHTS THAT YOU WOULD BE BETTER OFF DEAD, OR OF HURTING YOURSELF: 0

## 2024-01-03 NOTE — PATIENT INSTRUCTIONS
SURVEY:     You may be receiving a survey from [facility] [email or web address] regarding your visit today.     Please complete the survey to enable us to provide the highest quality of care to you and your family.     If you cannot score us a very good on any question, please call the office to discuss how we could have made your experience a very good one.     Thank you,    Alonso Villanueva, APRN-CNP  Tracy Nash, APRN-CNP  Nancy, ALONZO Harry, BALDOMERO Cartwright, CMA  Casie, CMA  Melissa, PCA  Beth, PM